# Patient Record
Sex: FEMALE | Race: WHITE | Employment: FULL TIME | URBAN - METROPOLITAN AREA
[De-identification: names, ages, dates, MRNs, and addresses within clinical notes are randomized per-mention and may not be internally consistent; named-entity substitution may affect disease eponyms.]

---

## 2023-11-15 RX ORDER — HYDROCHLOROTHIAZIDE 25 MG/1
25 TABLET ORAL DAILY
COMMUNITY
Start: 2023-05-18

## 2023-11-15 RX ORDER — ATORVASTATIN CALCIUM 40 MG/1
40 TABLET, FILM COATED ORAL DAILY
COMMUNITY
Start: 2023-05-18

## 2023-11-15 RX ORDER — MAGNESIUM OXIDE/MAG AA CHELATE 300 MG
CAPSULE ORAL
COMMUNITY

## 2023-11-15 NOTE — PROGRESS NOTES
[facility] ENDOSCOPY COLONOSCOPY PRE-OPERATIVE INSTRUCTIONS    Procedure date_11/27/2023________Arrival time__1000__________        Surgery time____1100________     EGD to be done at this same encounter. Clear liquids the day before the procedure. Do not eat or drink anything within 5 hours of your procedure. This includes water chewing gum, mints and ice chips. You may brush your teeth and gargle the morning of your surgery, but do not swallow the water    You may be asked to stop blood thinners such as Coumadin, Plavix, Fragmin, Lovenox, etc., or any anti-inflammatories such as:  Aspirin, Ibuprofen, Advil, Naproxen prior to your procedure. We also ask that you stop any OTC medications such as fish oil, vitamin E, glucosamine, garlic, Multivitamins, COQ 10, etc.    You must make arrangements for a responsible adult to arrive with you and stay in our waiting area during your procedure. They will also need to take you home after your procedure. For your safety you will not be allowed to leave alone or drive yourself home. Also for your safety, it is strongly suggested that someone stay with you the first 24 hours after your procedure. For your comfort, please wear simple loose fitting clothing to the center. Please do not bring valuables. If you have a living will and a durable power of  for healthcare, please bring in a copy.      You will need to bring a photo ID and insurance card    Our goal is to provide you with excellent care so if you have any questions, please contact us at the [facility] at [phone]         Please note these are generalized instructions for all colonoscopy cases, you may be provided with more specific instructions if necessary

## 2023-11-22 ENCOUNTER — ANESTHESIA EVENT (OUTPATIENT)
Dept: ENDOSCOPY | Age: 58
End: 2023-11-22
Payer: COMMERCIAL

## 2023-11-27 ENCOUNTER — ANESTHESIA (OUTPATIENT)
Dept: ENDOSCOPY | Age: 58
End: 2023-11-27
Payer: COMMERCIAL

## 2023-11-27 ENCOUNTER — HOSPITAL ENCOUNTER (OUTPATIENT)
Age: 58
Setting detail: OUTPATIENT SURGERY
Discharge: HOME | End: 2023-11-27
Attending: INTERNAL MEDICINE | Admitting: INTERNAL MEDICINE
Payer: COMMERCIAL

## 2023-11-27 VITALS
RESPIRATION RATE: 18 BRPM | TEMPERATURE: 96.8 F | HEART RATE: 81 BPM | DIASTOLIC BLOOD PRESSURE: 87 MMHG | OXYGEN SATURATION: 97 % | HEIGHT: 62 IN | BODY MASS INDEX: 28.8 KG/M2 | SYSTOLIC BLOOD PRESSURE: 134 MMHG | WEIGHT: 156.53 LBS

## 2023-11-27 DIAGNOSIS — K57.32 DIVERTICULITIS, COLON: ICD-10-CM

## 2023-11-27 DIAGNOSIS — R10.13 EPIGASTRIC PAIN: ICD-10-CM

## 2023-11-27 PROCEDURE — 3700000001 HC ADD 15 MINUTES (ANESTHESIA): Performed by: INTERNAL MEDICINE

## 2023-11-27 PROCEDURE — 3609010600 HC COLONOSCOPY POLYPECTOMY SNARE/COLD BIOPSY: Performed by: INTERNAL MEDICINE

## 2023-11-27 PROCEDURE — 6360000002 HC RX W HCPCS: Performed by: NURSE ANESTHETIST, CERTIFIED REGISTERED

## 2023-11-27 PROCEDURE — 3609012400 HC EGD TRANSORAL BIOPSY SINGLE/MULTIPLE: Performed by: INTERNAL MEDICINE

## 2023-11-27 PROCEDURE — 7100000011 HC PHASE II RECOVERY - ADDTL 15 MIN: Performed by: INTERNAL MEDICINE

## 2023-11-27 PROCEDURE — 2500000003 HC RX 250 WO HCPCS: Performed by: NURSE ANESTHETIST, CERTIFIED REGISTERED

## 2023-11-27 PROCEDURE — 2580000003 HC RX 258: Performed by: STUDENT IN AN ORGANIZED HEALTH CARE EDUCATION/TRAINING PROGRAM

## 2023-11-27 PROCEDURE — 2709999900 HC NON-CHARGEABLE SUPPLY: Performed by: INTERNAL MEDICINE

## 2023-11-27 PROCEDURE — 88305 TISSUE EXAM BY PATHOLOGIST: CPT

## 2023-11-27 PROCEDURE — 3700000000 HC ANESTHESIA ATTENDED CARE: Performed by: INTERNAL MEDICINE

## 2023-11-27 PROCEDURE — 7100000010 HC PHASE II RECOVERY - FIRST 15 MIN: Performed by: INTERNAL MEDICINE

## 2023-11-27 RX ORDER — SODIUM CHLORIDE 9 MG/ML
INJECTION, SOLUTION INTRAVENOUS PRN
Status: DISCONTINUED | OUTPATIENT
Start: 2023-11-27 | End: 2023-11-27 | Stop reason: HOSPADM

## 2023-11-27 RX ORDER — LIDOCAINE HYDROCHLORIDE 20 MG/ML
INJECTION, SOLUTION EPIDURAL; INFILTRATION; INTRACAUDAL; PERINEURAL PRN
Status: DISCONTINUED | OUTPATIENT
Start: 2023-11-27 | End: 2023-11-27 | Stop reason: SDUPTHER

## 2023-11-27 RX ORDER — SODIUM CHLORIDE 0.9 % (FLUSH) 0.9 %
5-40 SYRINGE (ML) INJECTION PRN
Status: DISCONTINUED | OUTPATIENT
Start: 2023-11-27 | End: 2023-11-27 | Stop reason: HOSPADM

## 2023-11-27 RX ORDER — SODIUM CHLORIDE 0.9 % (FLUSH) 0.9 %
5-40 SYRINGE (ML) INJECTION PRN
Status: CANCELLED | OUTPATIENT
Start: 2023-11-27

## 2023-11-27 RX ORDER — SODIUM CHLORIDE 9 MG/ML
INJECTION, SOLUTION INTRAVENOUS PRN
Status: CANCELLED | OUTPATIENT
Start: 2023-11-27

## 2023-11-27 RX ORDER — SODIUM CHLORIDE 0.9 % (FLUSH) 0.9 %
5-40 SYRINGE (ML) INJECTION EVERY 12 HOURS SCHEDULED
Status: DISCONTINUED | OUTPATIENT
Start: 2023-11-27 | End: 2023-11-27 | Stop reason: HOSPADM

## 2023-11-27 RX ORDER — SODIUM CHLORIDE 0.9 % (FLUSH) 0.9 %
5-40 SYRINGE (ML) INJECTION EVERY 12 HOURS SCHEDULED
Status: CANCELLED | OUTPATIENT
Start: 2023-11-27

## 2023-11-27 RX ORDER — PROPOFOL 10 MG/ML
INJECTION, EMULSION INTRAVENOUS PRN
Status: DISCONTINUED | OUTPATIENT
Start: 2023-11-27 | End: 2023-11-27 | Stop reason: SDUPTHER

## 2023-11-27 RX ADMIN — LIDOCAINE HYDROCHLORIDE 60 MG: 20 INJECTION, SOLUTION EPIDURAL; INFILTRATION; INTRACAUDAL; PERINEURAL at 11:24

## 2023-11-27 RX ADMIN — PROPOFOL 50 MG: 10 INJECTION, EMULSION INTRAVENOUS at 11:28

## 2023-11-27 RX ADMIN — PROPOFOL 80 MG: 10 INJECTION, EMULSION INTRAVENOUS at 11:26

## 2023-11-27 RX ADMIN — PROPOFOL 150 MCG/KG/MIN: 10 INJECTION, EMULSION INTRAVENOUS at 11:33

## 2023-11-27 RX ADMIN — PROPOFOL 50 MG: 10 INJECTION, EMULSION INTRAVENOUS at 11:30

## 2023-11-27 RX ADMIN — SODIUM CHLORIDE: 9 INJECTION, SOLUTION INTRAVENOUS at 11:52

## 2023-11-27 RX ADMIN — SODIUM CHLORIDE: 9 INJECTION, SOLUTION INTRAVENOUS at 10:54

## 2023-11-27 RX ADMIN — PROPOFOL 80 MG: 10 INJECTION, EMULSION INTRAVENOUS at 11:24

## 2023-11-27 ASSESSMENT — PAIN SCALES - GENERAL
PAINLEVEL_OUTOF10: 0

## 2023-11-27 ASSESSMENT — PAIN - FUNCTIONAL ASSESSMENT: PAIN_FUNCTIONAL_ASSESSMENT: 0-10

## 2023-11-27 NOTE — DISCHARGE INSTRUCTIONS
Discharge Instructions for Colonoscopy     Colonoscopy is a visual exam of the lining of the large intestine, also called the bowel or colon, with a colonoscope. A colonoscope is a flexible tube with a light and a viewing device. It allows the doctor to view the inside of the colon through a tiny video camera. Colonoscopy is performed for many reasons: unexplained anemia , pain, diarrhea , bloody stools, cancer screening, among many other reasons. Complications from a colonoscopy are rare. Some possible serious complications include perforated bowel (which might require surgery) and bleeding (which could require blood transfusion ). Minor complications include bloating, gas, and cramping that can last for 1-2 days after the procedure. Because air is put into your colon during the procedure, it is normal to pass large amounts of air from your rectum. You may not have a bowel movement for 1-3 days after the procedure. What You Will Need:  Someone to drive you home after the procedure     Steps to Take:  [address] when you get home. Because the sedative will make you drowsy, don't drive, operate machinery, or make important decisions the day of the procedure. Feelings of bloating, gas, or cramping may persist for 24 hours. Diet -  Try sips of water first. If tolerated, resume bland food (scrambled eggs, toast, soup) first.  If tolerated, resume regular diet or the diet recommended by your physician. Do not drink alcohol for 24 hours. Physical Activity -  Ask your doctor when you will be able to return to work. Do not drive, operate heavy machinery, or do activities that require coordination or balance for 24 hours. Otherwise, return to your normal routine as soon as you are comfortable to do so, which is usually the next day after the procedure. Medications - When taking medications, it's important to:    Take your medication as directed, not more, not less, not at a different

## 2023-11-27 NOTE — ANESTHESIA POSTPROCEDURE EVALUATION
Department of Anesthesiology  Postprocedure Note    Patient: Maximiliano Green  MRN: [id number]  YOB: 1965  Date of evaluation: 11/27/2023      Procedure Summary       Date: 11/27/23 Room / Location: [address] [place] [address]    Anesthesia Start: 1212 Anesthesia Stop: 4193    Procedures:       COLONOSCOPY DIAGNOSTIC      EGD BIOPSY Diagnosis:       Diverticulitis, colon      Epigastric pain      (Diverticulitis, colon [K57.32])      (Epigastric pain [R10.13])    Surgeons: Cris Winters MD Responsible Provider: Miah Georges MD    Anesthesia Type: MAC ASA Status: 2            Anesthesia Type: No value filed.     Amanda Phase I: Amanda Score: 10    Amanda Phase II: Amanda Score: 9      Anesthesia Post Evaluation    Patient location during evaluation: bedside  Patient participation: complete - patient participated  Level of consciousness: awake and alert  Pain score: 0  Airway patency: patent  Nausea & Vomiting: no vomiting  Complications: no  Cardiovascular status: blood pressure returned to baseline  Respiratory status: acceptable  Hydration status: euvolemic  Pain management: adequate

## 2023-11-27 NOTE — ANESTHESIA PRE PROCEDURE
Department of Anesthesiology  Preprocedure Note       Name:  Paulina Steen   Age:  63 y.o.  :  1965                                          MRN:  [id number]         Date:  2023      Surgeon: Zeinab Taylor):  Luna Craft MD    Procedure: Procedure(s):  COLONOSCOPY DIAGNOSTIC  EGD ESOPHAGOGASTRODUODENOSCOPY    Medications prior to admission:   Prior to Admission medications    Medication Sig Start Date End Date Taking?  Authorizing Provider   atorvastatin (LIPITOR) 40 MG tablet Take 1 tablet by mouth daily 23  Yes Rayo Cassidy MD   hydroCHLOROthiazide (HYDRODIURIL) 25 MG tablet Take 1 tablet by mouth daily 23  Yes Rayo Cassidy MD   Probiotic Product (PROBIOTIC DAILY PO) Take by mouth   Yes Rayo Cassidy MD   lansoprazole (PREVACID SOLUTAB) 15 MG disintegrating tablet Take 1 tablet by mouth daily   Yes Rayo Cassidy MD   Magnesium 300 MG CAPS Take by mouth   Yes Rayo Cassidy MD       Current medications:    Current Facility-Administered Medications   Medication Dose Route Frequency Provider Last Rate Last Admin    sodium chloride flush 0.9 % injection 5-40 mL  5-40 mL IntraVENous 2 times per day Mikhail Swain MD        sodium chloride flush 0.9 % injection 5-40 mL  5-40 mL IntraVENous PRN Mikhail Swain MD        0.9 % sodium chloride infusion   IntraVENous PRN Mikhail Swain MD           Allergies:  No Known Allergies    Problem List:    Patient Active Problem List   Diagnosis Code    Absence of menstruation N91.2    Attention deficit disorder F98.8    Flatulence, eructation, and gas pain R14.3, R14.1, R14.2    Dysthymic disorder F34.1    Esophageal reflux K21.9    Malaise and fatigue R53.81, R53.83    Headache R51.9    Pure hypercholesterolemia E78.00    Insomnia G47.00    Irritable bowel syndrome K58.9    Other specified counseling Z71.89    Disease of jaw M27.9    Acute upper respiratory infection J06.9       Past Medical History:

## 2023-11-27 NOTE — H&P
Esophagogastroduodenoscopy and Colonoscopy Note    Patient:   Warden Reynaga    :    1965    Facility:   [facility] [Outpatient]   Referring/PCP: MARYCARMEN Leonard CNP    Procedure:   Esophagogastroduodenoscopy and Colonoscopy  Date:     2023   Endoscopist:  Biju Jenkins MD     Preoperative Diagnosis:   Reflux, history of diverticulitis    Postoperative Diagnosis: Gastritis, focal ulcerations with surrounding erythema in the distal sigmoid colon. Diverticular disease in the sigmoid and distal descending colon. There were no polyps. Anesthesia:  MAC    Estimated blood loss: Minimal    Complications: None    Specimen: Gastric biopsies, biopsy of are of focal ulcerations in the distal sigmoid colon. Instrument: ,     Description of Procedure:  Informed consent was obtained from the patient after explanation of the procedures including indications, description of the procedures,  benefits and possible risks and complications of the procedures, and alternatives. Questions were answered. The patient's history was reviewed and a directed physical examination was performed prior to the procedures. Patient was monitored throughout the procedures with pulse oximetry and periodic assessment of vital signs. Patient was sedated as noted above. With the patient in the left lateral decubitus position, the V-Key videoendoscope was placed in the patient's mouth and under direct visualization passed into the esophagus. Visualization of the esophagus, stomach, and duodenum was performed during both introduction and withdrawal of the endoscope and retroflexed view of the proximal stomach was obtained. The scope was passed to the 2nd portion of the duodenum. Next, with the patient in the left lateral decubitus position, a digital rectal examination was performed and revealed negative without mass, lesions or tenderness.   [employer]

## 2023-11-27 NOTE — OP NOTE
Esophagogastroduodenoscopy and Colonoscopy Note     Patient:                       Vernell Bowie                    :                           1965            Facility:                      [facility] [Outpatient]   Referring/PCP:          Marylene Coin, APRN - CNP       Procedure:                 Esophagogastroduodenoscopy and Colonoscopy  Date:                           2023   Endoscopist:             Yonatan Rolle MD      Preoperative Diagnosis:   Reflux, history of diverticulitis     Postoperative Diagnosis: Gastritis, focal ulcerations with surrounding erythema in the distal sigmoid colon. Diverticular disease in the sigmoid and distal descending colon. There were no polyps. Anesthesia:  MAC     Estimated blood loss: Minimal     Complications: None     Specimen: Gastric biopsies, biopsy of are of focal ulcerations in the distal sigmoid colon. Instrument: ,      Description of Procedure:  Informed consent was obtained from the patient after explanation of the procedures including indications, description of the procedures,  benefits and possible risks and complications of the procedures, and alternatives. Questions were answered. The patient's history was reviewed and a directed physical examination was performed prior to the procedures. Patient was monitored throughout the procedures with pulse oximetry and periodic assessment of vital signs. Patient was sedated as noted above. With the patient in the left lateral decubitus position, the Olympus videoendoscope was placed in the patient's mouth and under direct visualization passed into the esophagus. Visualization of the esophagus, stomach, and duodenum was performed during both introduction and withdrawal of the endoscope and retroflexed view of the proximal stomach was obtained. The scope was passed to the 2nd portion of the duodenum.  Next, with the patient in the left lateral decubitus

## 2023-11-27 NOTE — H&P
[facility]   Attending Pre-operative History and Physical      PROCEDURE:  EGD and Colonoscopy    Indication:The patient is a 63 y.o. female presents for an EGD and Colonoscopy . Past Medical History:    Past Medical History:   Diagnosis Date    Asthma 2011    Diverticulosis 2006    diverticulitis    Hyperlipidemia     Thyroid disease     thyroid mass-benign      Past Surgical History:    Past Surgical History:   Procedure Laterality Date    BREAST SURGERY      enhancement (1991) & reduction (2021)    COLONOSCOPY  2006    ENDOSCOPY, COLON, DIAGNOSTIC      MYOMECTOMY  2006    THYROIDECTOMY, PARTIAL      UTERINE FIBROID SURGERY  2008      Medications Prior to Admission:   Prior to Admission medications    Medication Sig Start Date End Date Taking? Authorizing Provider   atorvastatin (LIPITOR) 40 MG tablet Take 1 tablet by mouth daily 5/18/23  Yes Rayo Cassidy MD   hydroCHLOROthiazide (HYDRODIURIL) 25 MG tablet Take 1 tablet by mouth daily 5/18/23  Yes Rayo Cassidy MD   Probiotic Product (PROBIOTIC DAILY PO) Take by mouth   Yes Rayo Cassidy MD   lansoprazole (PREVACID SOLUTAB) 15 MG disintegrating tablet Take 1 tablet by mouth daily   Yes Rayo Cassidy MD   Magnesium 300 MG CAPS Take by mouth   Yes Rayo Cassidy MD        Allergies:  Patient has no known allergies.   History of allergic reaction to anesthesia:  No    Social History:   Social History     Socioeconomic History    Marital status:      Spouse name: Not on file    Number of children: Not on file    Years of education: Not on file    Highest education level: Not on file   Occupational History    Not on file   Tobacco Use    Smoking status: Never    Smokeless tobacco: Never   Substance and Sexual Activity    Alcohol use: Yes     Comment: occasionally    Drug use: Never    Sexual activity: Not on file   Other Topics Concern    Not on file   Social History Narrative    Not on file     Social Determinants

## (undated) DEVICE — FORCEPS BX 240CM 2.4MM L NDL RAD JAW 4 M00513334